# Patient Record
Sex: FEMALE | Race: WHITE | NOT HISPANIC OR LATINO | URBAN - METROPOLITAN AREA
[De-identification: names, ages, dates, MRNs, and addresses within clinical notes are randomized per-mention and may not be internally consistent; named-entity substitution may affect disease eponyms.]

---

## 2017-06-06 ENCOUNTER — HISTORICAL (OUTPATIENT)
Dept: ADMINISTRATIVE | Facility: HOSPITAL | Age: 27
End: 2017-06-06

## 2017-06-06 LAB
ABS NEUT (OLG): 3.04 X10(3)/MCL (ref 2.1–9.2)
ALBUMIN SERPL-MCNC: 4.9 GM/DL (ref 3.4–5)
ALBUMIN/GLOB SERPL: 1 RATIO (ref 1–2)
ALP SERPL-CCNC: 62 UNIT/L (ref 20–120)
ALT SERPL-CCNC: 14 UNIT/L
AST SERPL-CCNC: 16 UNIT/L
BASOPHILS # BLD AUTO: 0.03 X10(3)/MCL
BASOPHILS NFR BLD AUTO: 0 % (ref 0–1)
BILIRUB SERPL-MCNC: 1.1 MG/DL
BILIRUBIN DIRECT+TOT PNL SERPL-MCNC: 0.1 MG/DL
BILIRUBIN DIRECT+TOT PNL SERPL-MCNC: 1 MG/DL
BUN SERPL-MCNC: 12 MG/DL (ref 7–25)
CALCIUM SERPL-MCNC: 9.6 MG/DL (ref 8.4–10.3)
CHLORIDE SERPL-SCNC: 103 MMOL/L (ref 96–110)
CO2 SERPL-SCNC: 27 MMOL/L (ref 24–32)
CREAT SERPL-MCNC: 0.66 MG/DL (ref 0.7–1.1)
EOSINOPHIL # BLD AUTO: 0.13 10*3/UL
EOSINOPHIL NFR BLD AUTO: 2 % (ref 0–5)
ERYTHROCYTE [DISTWIDTH] IN BLOOD BY AUTOMATED COUNT: 12.3 % (ref 11.5–14.5)
GLOBULIN SER-MCNC: 3.6 GM/ML (ref 2.3–3.5)
GLUCOSE SERPL-MCNC: 97 MG/DL (ref 65–99)
HCT VFR BLD AUTO: 37.3 % (ref 35–46)
HGB BLD-MCNC: 12.7 GM/DL (ref 12–16)
HIV 1+2 AB+HIV1 P24 AG SERPL QL IA: NONREACTIVE
IMM GRANULOCYTES # BLD AUTO: 0.02 10*3/UL
IMM GRANULOCYTES NFR BLD AUTO: 0 %
LYMPHOCYTES # BLD AUTO: 2.49 X10(3)/MCL
LYMPHOCYTES NFR BLD AUTO: 40 % (ref 15–40)
MCH RBC QN AUTO: 28.2 PG (ref 26–34)
MCHC RBC AUTO-ENTMCNC: 34 GM/DL (ref 31–37)
MCV RBC AUTO: 82.7 FL (ref 80–100)
MONOCYTES # BLD AUTO: 0.44 X10(3)/MCL
MONOCYTES NFR BLD AUTO: 7 % (ref 4–12)
NEUTROPHILS # BLD AUTO: 3.04 X10(3)/MCL
NEUTROPHILS NFR BLD AUTO: 49 X10(3)/MCL
PLATELET # BLD AUTO: 311 X10(3)/MCL (ref 130–400)
PMV BLD AUTO: 10.3 FL (ref 7.4–10.4)
POTASSIUM SERPL-SCNC: 3.7 MMOL/L (ref 3.6–5.2)
PROT SERPL-MCNC: 8.5 GM/DL (ref 6–8)
RBC # BLD AUTO: 4.51 X10(6)/MCL (ref 4–5.2)
SODIUM SERPL-SCNC: 137 MMOL/L (ref 135–146)
TSH SERPL-ACNC: 0.89 MIU/L (ref 0.5–5)
WBC # SPEC AUTO: 6.2 X10(3)/MCL (ref 4.5–11)

## 2017-09-08 LAB — POC BETA-HCG (QUAL): NEGATIVE

## 2021-07-07 LAB — POC BETA-HCG (QUAL): NEGATIVE

## 2022-01-16 ENCOUNTER — HISTORICAL (OUTPATIENT)
Dept: ADMINISTRATIVE | Facility: HOSPITAL | Age: 32
End: 2022-01-16

## 2022-09-17 ENCOUNTER — HISTORICAL (OUTPATIENT)
Dept: ADMINISTRATIVE | Facility: HOSPITAL | Age: 32
End: 2022-09-17
Payer: MEDICAID

## 2023-01-30 DIAGNOSIS — N39.0 URINARY TRACT INFECTION WITHOUT HEMATURIA, SITE UNSPECIFIED: Primary | ICD-10-CM

## 2023-01-30 RX ORDER — PHENAZOPYRIDINE HYDROCHLORIDE 200 MG/1
200 TABLET, FILM COATED ORAL 3 TIMES DAILY PRN
Qty: 6 TABLET | Refills: 0 | Status: SHIPPED | OUTPATIENT
Start: 2023-01-30 | End: 2023-02-01

## 2023-01-30 RX ORDER — NITROFURANTOIN 25; 75 MG/1; MG/1
100 CAPSULE ORAL 2 TIMES DAILY
Qty: 14 CAPSULE | Refills: 0 | Status: SHIPPED | OUTPATIENT
Start: 2023-01-30 | End: 2023-02-06

## 2023-02-02 ENCOUNTER — TELEPHONE (OUTPATIENT)
Dept: FAMILY MEDICINE | Facility: CLINIC | Age: 33
End: 2023-02-02
Payer: MEDICAID

## 2023-02-02 NOTE — TELEPHONE ENCOUNTER
----- Message from Leelee Boles MD sent at 2/2/2023  1:09 PM CST -----  The Ucx is sensitive to Nitrofurantoin but there are other meds more sensitive than macrobid. If Sx did not get better then we can senf some Levofloxacin. Please let us know if you need Levofloxacin.

## 2023-04-04 DIAGNOSIS — J32.9 SINUSITIS, UNSPECIFIED CHRONICITY, UNSPECIFIED LOCATION: Primary | ICD-10-CM

## 2023-04-04 RX ORDER — AMOXICILLIN AND CLAVULANATE POTASSIUM 875; 125 MG/1; MG/1
1 TABLET, FILM COATED ORAL EVERY 12 HOURS
Qty: 14 TABLET | Refills: 0 | Status: SHIPPED | OUTPATIENT
Start: 2023-04-04 | End: 2023-04-04

## 2023-04-04 RX ORDER — AMOXICILLIN AND CLAVULANATE POTASSIUM 875; 125 MG/1; MG/1
1 TABLET, FILM COATED ORAL EVERY 12 HOURS
Qty: 14 TABLET | Refills: 0 | Status: SHIPPED | OUTPATIENT
Start: 2023-04-04 | End: 2023-04-11

## 2024-04-23 ENCOUNTER — LAB VISIT (OUTPATIENT)
Dept: LAB | Facility: HOSPITAL | Age: 34
End: 2024-04-23
Attending: OBSTETRICS & GYNECOLOGY
Payer: MEDICAID

## 2024-04-23 ENCOUNTER — TELEPHONE (OUTPATIENT)
Dept: FAMILY MEDICINE | Facility: CLINIC | Age: 34
End: 2024-04-23
Payer: MEDICAID

## 2024-04-23 DIAGNOSIS — Z01.818 OTHER SPECIFIED PRE-OPERATIVE EXAMINATION: ICD-10-CM

## 2024-04-23 DIAGNOSIS — Z01.818 OTHER SPECIFIED PRE-OPERATIVE EXAMINATION: Primary | ICD-10-CM

## 2024-04-23 LAB
BASOPHILS # BLD AUTO: 0.03 X10(3)/MCL
BASOPHILS NFR BLD AUTO: 0.6 %
EOSINOPHIL # BLD AUTO: 0.18 X10(3)/MCL (ref 0–0.9)
EOSINOPHIL NFR BLD AUTO: 3.5 %
ERYTHROCYTE [DISTWIDTH] IN BLOOD BY AUTOMATED COUNT: 13.8 % (ref 11.5–17)
HCT VFR BLD AUTO: 36.2 % (ref 37–47)
HGB BLD-MCNC: 12.1 G/DL (ref 12–16)
IMM GRANULOCYTES # BLD AUTO: 0.01 X10(3)/MCL (ref 0–0.04)
IMM GRANULOCYTES NFR BLD AUTO: 0.2 %
LYMPHOCYTES # BLD AUTO: 2.11 X10(3)/MCL (ref 0.6–4.6)
LYMPHOCYTES NFR BLD AUTO: 41.2 %
MCH RBC QN AUTO: 28.8 PG (ref 27–31)
MCHC RBC AUTO-ENTMCNC: 33.4 G/DL (ref 33–36)
MCV RBC AUTO: 86.2 FL (ref 80–94)
MONOCYTES # BLD AUTO: 0.32 X10(3)/MCL (ref 0.1–1.3)
MONOCYTES NFR BLD AUTO: 6.3 %
NEUTROPHILS # BLD AUTO: 2.47 X10(3)/MCL (ref 2.1–9.2)
NEUTROPHILS NFR BLD AUTO: 48.2 %
NRBC BLD AUTO-RTO: 0 %
PLATELET # BLD AUTO: 275 X10(3)/MCL (ref 130–400)
PMV BLD AUTO: 10 FL (ref 7.4–10.4)
RBC # BLD AUTO: 4.2 X10(6)/MCL (ref 4.2–5.4)
WBC # SPEC AUTO: 5.12 X10(3)/MCL (ref 4.5–11.5)

## 2024-04-23 PROCEDURE — 85025 COMPLETE CBC W/AUTO DIFF WBC: CPT

## 2024-04-23 PROCEDURE — 36415 COLL VENOUS BLD VENIPUNCTURE: CPT

## 2024-04-23 NOTE — TELEPHONE ENCOUNTER
----- Message from Leelee Boles MD sent at 4/23/2024  3:03 PM CDT -----  Please inform patient of lab results.    CBC normal   Recommend the patient to continue iron rich foods since the patient recently gave birth.  Encourage hydration and 35 minutes of brisk walking   Recommend to continue multivitamins   Start mental health exercises like yoga and reach out to family and PCP if feeling depressed      Thanks,    Dr. Boles

## 2024-04-23 NOTE — PROGRESS NOTES
Please inform patient of lab results.    CBC normal   Recommend the patient to continue iron rich foods since the patient recently gave birth.  Encourage hydration and 35 minutes of brisk walking   Recommend to continue multivitamins   Start mental health exercises like yoga and reach out to family and PCP if feeling depressed      Thanks,    Dr. Boles

## 2024-04-24 PROBLEM — Z30.2 ADMISSION FOR STERILIZATION: Status: ACTIVE | Noted: 2024-04-24

## 2024-04-24 NOTE — H&P
History & Physical  Gynecology    SUBJECTIVE:     Chief Complaint/Reason for Admission: Laparoscopic Bilateral Tubal Fulguration    History of Present Illness:  Patient is a 33 y.o.  who presents with undesired future fertility and desires permanent sterilization.     No current facility-administered medications for this encounter.     No current outpatient medications on file.       Review of patient's allergies indicates:   Allergen Reactions    Sulfa (sulfonamide antibiotics) Hives       Past medical history:none    Past surgical history:none         Review of Systems:  Constitutional: no fever or chills  Respiratory: no cough or shortness of breath  Cardiovascular: no chest pain or palpitations  Genitourinary: no hematuria or dysuria  Neuro:No headaches, dizziness or blurry vision.     OBJECTIVE:     Vital Signs (Most Recent):       Physical Exam:  General: well developed, well nourished  Lungs:  clear to auscultation bilaterally and normal respiratory effort  Cardiovascular: Heart with regular rate and rhythm.    Abdomen: soft and non tender  Back: no CVAT   Neuro: cranial nerves 2-12 grossly intact.  Pelvic:   NFEG w/o lesions noted. Small mobile uterus  Extremities: normal ROM, no homans or palpable cords.     Laboratory:  Lab Results   Component Value Date    WBC 5.12 2024    HGB 12.1 2024    HCT 36.2 (L) 2024    MCV 86.2 2024     2024       Ultrasound:  No results found for this or any previous visit.          ASSESSMENT/PLAN:     Active Hospital Problems    Diagnosis  POA    *Admission for sterilization [Z30.2]  Not Applicable      Resolved Hospital Problems   No resolved problems to display.       To OR for LBTF    Preop labs and studies ordered.    NPO 8 hours prior to surgery.     Medications reviewed.     Risks and benefits explained in detail to patient, including but not limited to damage to internal organs, including but not limited to bowel, bladder,  uterus, tubes, ovaries, nerves, arteries, veins, possible need for blood transfusion.  Patient understands that for all practical purposes that this procedure in not reversible. There is also a risk of failing sterility and this can be as high as 1:200. All questions answered. Patient is aware of all risks and desires surgery. Consents signed.

## 2024-04-27 NOTE — DISCHARGE INSTRUCTIONS
Tubal Ligation, Laparoscopic Surgery Discharge Instructions   About this topic   A tubal ligation is a long-lasting type of birth control for women. After this surgery, it would be rare for a woman to get pregnant. A woman's eggs are made in her ovaries. Once a month, an egg leaves the ovary and travels down a long thin tube to her uterus or womb. This tube is called the fallopian tube. If a sperm meets the egg, a woman can get pregnant.  During a tubal ligation, the fallopian tubes are cut or tied. Cutting or tying the tubes will stop the sperm from meeting the egg. After this surgery, most women cannot get pregnant.  A tubal ligation is a permanent form of birth control. You should be 100% sure before you have this surgery that you do not want to have any more children. You and your partner should have talked about your decision to not have any more children. A tubal ligation's reversal can occur but is not always successful.  This surgery does not protect you from sexually-transmitted diseases. You will still need to use a condom to protect yourself and your partner against these diseases.       What care is needed at home?   You have steri-strips. You may wash your incision with soap and water. Wash your hands before and after touching your incsions. The steri-strips may fall off on there on within 1-2 weeks. You do not have to peel them off.   You may take a shower tomorrow. No tub baths or soaking. No swimming.    Pelvic rest until your follow-up appointment with your doctor. No sex, tampons, and do not douche.  Ask your doctor when you can go back to your normal activities like work, driving, and sex  You can expect some bleeding from your vagina for a few weeks. You may use sanitary pads but not tampons.  Your bowel movements may take some time to get back to normal. Eat small meals high in fiber to avoid hard stools. Drink 6 to 8 glasses of water each day.  Try to walk each day. Start by walking a little  more than you did the day before. Walking boosts blood flow and helps prevent lung, belly, and blood problems.  You will continue to have periods as you did before.  Talk with your doctor about safe sex as you can still be exposed to sexually transmitted diseases.  Do not lift anything over 10 pounds (4.5 kg)  What follow-up care is needed?   Be sure to keep your visits.   Will physical activity be limited?   Talk with your doctor about the right amount of activity for you.  What problems could happen?   Infection  Wound opening  Heavy blood loss  Blood clots in your legs or lungs  Damage to your bowel, bladder, and other organs inside the belly  Tubes don't close all the way and you could become pregnant  Trouble passing bowel movements  When do I need to call the doctor?   Signs of infection such as a fever of 100.4°F (38°C) or higher, chills, pain with passing urine.  Signs of wound infection such as swelling, redness, warmth around the wound; too much pain when touched; yellowish, greenish, or bloody discharge; foul smell coming from the cut site; cut site opens up.  Excessive blood in your sanitary pads or more than six soaked pads in a day  Smelly green or dark yellow vaginal discharge  Upset stomach, throwing up, or very bad belly pain  Pain not helped by drugs you are taking  No bowel movement after 3 days  If you feel the need to pass urine but urine will not come out even after 6 hours  Swelling in your leg or arm that is much greater on one side than the other  Teach Back: Helping You Understand   The Teach Back Method helps you understand the information we are giving you. After you talk with the staff, tell them in your own words what you learned. This helps to make sure the staff has described each thing clearly. It also helps to explain things that may have been confusing. Before going home, make sure you can do these:  I can tell you about my procedure.  I can tell you how to care for my cut site.  I  can tell you what I will do if I have a fever, chills, bad smelling drainage from the vagina, or bad belly pain.  Where can I learn more?   American College [employer] Ob[employer]nec[employer]  https://www.acog.org/Patients/FAQs/Sterilization-by-Laparoscopy   Better [employer]  https://www.betterhealth.[place].gov.au/health/HealthyLiving/contraception-female-sterilisation   Office on Womens Health  https://www.[facility]ensh[facility] Hospit[facility].gov/a-z-topics/birth-control-methods   Last Reviewed Date   2019-10-18  Consumer Information Use and Disclaimer   This information is not specific medical advice and does not replace information you receive from your health care provider. This is only a brief summary of general information. It does NOT include all information about conditions, illnesses, injuries, tests, procedures, treatments, therapies, discharge instructions or life-style choices that may apply to you. You must talk with your health care provider for complete information about your health and treatment options. This information should not be used to decide whether or not to accept your health care providers advice, instructions or recommendations. Only your health care provider has the knowledge and training to provide advice that is right for you.  Copyright   Copyright © 2021 [employer] and its affiliates and/or licensors. All rights reserved.

## 2024-04-28 ENCOUNTER — PATIENT MESSAGE (OUTPATIENT)
Dept: ADMINISTRATIVE | Facility: OTHER | Age: 34
End: 2024-04-28
Payer: MEDICAID

## 2024-04-29 ENCOUNTER — ANESTHESIA EVENT (OUTPATIENT)
Dept: SURGERY | Facility: HOSPITAL | Age: 34
End: 2024-04-29
Payer: MEDICAID

## 2024-04-29 ENCOUNTER — HOSPITAL ENCOUNTER (OUTPATIENT)
Facility: HOSPITAL | Age: 34
Discharge: HOME | End: 2024-04-29
Attending: OBSTETRICS & GYNECOLOGY | Admitting: OBSTETRICS & GYNECOLOGY
Payer: MEDICAID

## 2024-04-29 ENCOUNTER — ANESTHESIA (OUTPATIENT)
Dept: SURGERY | Facility: HOSPITAL | Age: 34
End: 2024-04-29
Payer: MEDICAID

## 2024-04-29 DIAGNOSIS — Z30.2 ADMISSION FOR STERILIZATION: Primary | ICD-10-CM

## 2024-04-29 LAB
B-HCG UR QL: NEGATIVE
CTP QC/QA: YES

## 2024-04-29 PROCEDURE — 71000033 HC RECOVERY, INTIAL HOUR: Performed by: OBSTETRICS & GYNECOLOGY

## 2024-04-29 PROCEDURE — D9220A PRA ANESTHESIA: Mod: ANES,,, | Performed by: ANESTHESIOLOGY

## 2024-04-29 PROCEDURE — 81025 URINE PREGNANCY TEST: CPT | Performed by: OBSTETRICS & GYNECOLOGY

## 2024-04-29 PROCEDURE — 71000015 HC POSTOP RECOV 1ST HR: Performed by: OBSTETRICS & GYNECOLOGY

## 2024-04-29 PROCEDURE — 63600175 PHARM REV CODE 636 W HCPCS: Performed by: ANESTHESIOLOGY

## 2024-04-29 PROCEDURE — 63600175 PHARM REV CODE 636 W HCPCS

## 2024-04-29 PROCEDURE — 37000008 HC ANESTHESIA 1ST 15 MINUTES: Performed by: OBSTETRICS & GYNECOLOGY

## 2024-04-29 PROCEDURE — 25000003 PHARM REV CODE 250: Performed by: OBSTETRICS & GYNECOLOGY

## 2024-04-29 PROCEDURE — D9220A PRA ANESTHESIA: Mod: CRNA,,,

## 2024-04-29 PROCEDURE — 25000003 PHARM REV CODE 250: Performed by: ANESTHESIOLOGY

## 2024-04-29 PROCEDURE — 71000016 HC POSTOP RECOV ADDL HR: Performed by: OBSTETRICS & GYNECOLOGY

## 2024-04-29 PROCEDURE — 37000009 HC ANESTHESIA EA ADD 15 MINS: Performed by: OBSTETRICS & GYNECOLOGY

## 2024-04-29 PROCEDURE — 25000003 PHARM REV CODE 250

## 2024-04-29 PROCEDURE — 36000708 HC OR TIME LEV III 1ST 15 MIN: Performed by: OBSTETRICS & GYNECOLOGY

## 2024-04-29 PROCEDURE — 36000709 HC OR TIME LEV III EA ADD 15 MIN: Performed by: OBSTETRICS & GYNECOLOGY

## 2024-04-29 RX ORDER — KETOROLAC TROMETHAMINE 30 MG/ML
INJECTION, SOLUTION INTRAMUSCULAR; INTRAVENOUS
Status: DISCONTINUED | OUTPATIENT
Start: 2024-04-29 | End: 2024-04-29

## 2024-04-29 RX ORDER — LIDOCAINE HYDROCHLORIDE 10 MG/ML
1 INJECTION, SOLUTION EPIDURAL; INFILTRATION; INTRACAUDAL; PERINEURAL ONCE
Status: DISCONTINUED | OUTPATIENT
Start: 2024-04-29 | End: 2024-04-29 | Stop reason: HOSPADM

## 2024-04-29 RX ORDER — DEXMEDETOMIDINE HYDROCHLORIDE 100 UG/ML
INJECTION, SOLUTION INTRAVENOUS
Status: DISCONTINUED | OUTPATIENT
Start: 2024-04-29 | End: 2024-04-29

## 2024-04-29 RX ORDER — GLYCOPYRROLATE 0.2 MG/ML
INJECTION INTRAMUSCULAR; INTRAVENOUS
Status: DISCONTINUED | OUTPATIENT
Start: 2024-04-29 | End: 2024-04-29

## 2024-04-29 RX ORDER — HYDROCODONE BITARTRATE AND ACETAMINOPHEN 10; 325 MG/1; MG/1
1 TABLET ORAL EVERY 4 HOURS PRN
Status: DISCONTINUED | OUTPATIENT
Start: 2024-04-29 | End: 2024-04-29 | Stop reason: HOSPADM

## 2024-04-29 RX ORDER — HYDROCODONE BITARTRATE AND ACETAMINOPHEN 5; 325 MG/1; MG/1
1 TABLET ORAL
Status: CANCELLED | OUTPATIENT
Start: 2024-04-29

## 2024-04-29 RX ORDER — IBUPROFEN 800 MG/1
800 TABLET ORAL EVERY 8 HOURS PRN
Qty: 30 TABLET | Refills: 2 | Status: SHIPPED | OUTPATIENT
Start: 2024-04-29

## 2024-04-29 RX ORDER — ACETAMINOPHEN 325 MG/1
650 TABLET ORAL EVERY 4 HOURS PRN
Status: DISCONTINUED | OUTPATIENT
Start: 2024-04-29 | End: 2024-04-29 | Stop reason: HOSPADM

## 2024-04-29 RX ORDER — HYDROCODONE BITARTRATE AND ACETAMINOPHEN 5; 325 MG/1; MG/1
1 TABLET ORAL EVERY 6 HOURS PRN
Qty: 5 TABLET | Refills: 0 | Status: SHIPPED | OUTPATIENT
Start: 2024-04-29

## 2024-04-29 RX ORDER — ONDANSETRON 4 MG/1
8 TABLET, ORALLY DISINTEGRATING ORAL EVERY 8 HOURS PRN
Status: DISCONTINUED | OUTPATIENT
Start: 2024-04-29 | End: 2024-04-29 | Stop reason: HOSPADM

## 2024-04-29 RX ORDER — LIDOCAINE HYDROCHLORIDE 10 MG/ML
1 INJECTION, SOLUTION EPIDURAL; INFILTRATION; INTRACAUDAL; PERINEURAL ONCE AS NEEDED
Status: DISCONTINUED | OUTPATIENT
Start: 2024-04-29 | End: 2024-04-29 | Stop reason: HOSPADM

## 2024-04-29 RX ORDER — FENTANYL CITRATE 50 UG/ML
INJECTION, SOLUTION INTRAMUSCULAR; INTRAVENOUS
Status: DISCONTINUED | OUTPATIENT
Start: 2024-04-29 | End: 2024-04-29

## 2024-04-29 RX ORDER — ONDANSETRON HYDROCHLORIDE 2 MG/ML
INJECTION, SOLUTION INTRAMUSCULAR; INTRAVENOUS
Status: DISCONTINUED | OUTPATIENT
Start: 2024-04-29 | End: 2024-04-29

## 2024-04-29 RX ORDER — HYDROMORPHONE HYDROCHLORIDE 2 MG/ML
0.4 INJECTION, SOLUTION INTRAMUSCULAR; INTRAVENOUS; SUBCUTANEOUS EVERY 5 MIN PRN
Status: DISCONTINUED | OUTPATIENT
Start: 2024-04-29 | End: 2024-04-29 | Stop reason: HOSPADM

## 2024-04-29 RX ORDER — DEXAMETHASONE SODIUM PHOSPHATE 4 MG/ML
INJECTION, SOLUTION INTRA-ARTICULAR; INTRALESIONAL; INTRAMUSCULAR; INTRAVENOUS; SOFT TISSUE
Status: DISCONTINUED | OUTPATIENT
Start: 2024-04-29 | End: 2024-04-29

## 2024-04-29 RX ORDER — TRAMADOL HYDROCHLORIDE 50 MG/1
50 TABLET ORAL
Status: COMPLETED | OUTPATIENT
Start: 2024-04-29 | End: 2024-04-29

## 2024-04-29 RX ORDER — SODIUM CHLORIDE 9 MG/ML
INJECTION, SOLUTION INTRAVENOUS CONTINUOUS
Status: DISCONTINUED | OUTPATIENT
Start: 2024-04-29 | End: 2024-04-29 | Stop reason: HOSPADM

## 2024-04-29 RX ORDER — PROPOFOL 10 MG/ML
VIAL (ML) INTRAVENOUS
Status: DISCONTINUED | OUTPATIENT
Start: 2024-04-29 | End: 2024-04-29

## 2024-04-29 RX ORDER — ONDANSETRON 4 MG/1
4 TABLET, ORALLY DISINTEGRATING ORAL
Status: COMPLETED | OUTPATIENT
Start: 2024-04-29 | End: 2024-04-29

## 2024-04-29 RX ORDER — HYDROCODONE BITARTRATE AND ACETAMINOPHEN 5; 325 MG/1; MG/1
1 TABLET ORAL EVERY 4 HOURS PRN
Status: DISCONTINUED | OUTPATIENT
Start: 2024-04-29 | End: 2024-04-29 | Stop reason: HOSPADM

## 2024-04-29 RX ORDER — ACETAMINOPHEN 500 MG
1000 TABLET ORAL
Status: COMPLETED | OUTPATIENT
Start: 2024-04-29 | End: 2024-04-29

## 2024-04-29 RX ORDER — BUPIVACAINE HYDROCHLORIDE AND EPINEPHRINE 2.5; 5 MG/ML; UG/ML
INJECTION, SOLUTION EPIDURAL; INFILTRATION; INTRACAUDAL; PERINEURAL
Status: DISCONTINUED
Start: 2024-04-29 | End: 2024-04-29 | Stop reason: HOSPADM

## 2024-04-29 RX ORDER — SODIUM CHLORIDE, SODIUM LACTATE, POTASSIUM CHLORIDE, CALCIUM CHLORIDE 600; 310; 30; 20 MG/100ML; MG/100ML; MG/100ML; MG/100ML
INJECTION, SOLUTION INTRAVENOUS CONTINUOUS
Status: DISCONTINUED | OUTPATIENT
Start: 2024-04-29 | End: 2024-04-29 | Stop reason: HOSPADM

## 2024-04-29 RX ORDER — PROCHLORPERAZINE EDISYLATE 5 MG/ML
5 INJECTION INTRAMUSCULAR; INTRAVENOUS EVERY 30 MIN PRN
Status: DISCONTINUED | OUTPATIENT
Start: 2024-04-29 | End: 2024-04-29 | Stop reason: HOSPADM

## 2024-04-29 RX ORDER — BUPIVACAINE HYDROCHLORIDE AND EPINEPHRINE 2.5; 5 MG/ML; UG/ML
INJECTION, SOLUTION EPIDURAL; INFILTRATION; INTRACAUDAL; PERINEURAL
Status: DISCONTINUED | OUTPATIENT
Start: 2024-04-29 | End: 2024-04-29 | Stop reason: HOSPADM

## 2024-04-29 RX ORDER — MEPERIDINE HYDROCHLORIDE 25 MG/ML
6.25 INJECTION INTRAMUSCULAR; INTRAVENOUS; SUBCUTANEOUS ONCE AS NEEDED
Status: DISCONTINUED | OUTPATIENT
Start: 2024-04-29 | End: 2024-04-29 | Stop reason: HOSPADM

## 2024-04-29 RX ORDER — GABAPENTIN 300 MG/1
300 CAPSULE ORAL
Status: COMPLETED | OUTPATIENT
Start: 2024-04-29 | End: 2024-04-29

## 2024-04-29 RX ORDER — SODIUM CHLORIDE, SODIUM GLUCONATE, SODIUM ACETATE, POTASSIUM CHLORIDE AND MAGNESIUM CHLORIDE 30; 37; 368; 526; 502 MG/100ML; MG/100ML; MG/100ML; MG/100ML; MG/100ML
INJECTION, SOLUTION INTRAVENOUS CONTINUOUS
Status: DISCONTINUED | OUTPATIENT
Start: 2024-04-29 | End: 2024-04-29 | Stop reason: HOSPADM

## 2024-04-29 RX ORDER — ROCURONIUM BROMIDE 10 MG/ML
INJECTION, SOLUTION INTRAVENOUS
Status: DISCONTINUED | OUTPATIENT
Start: 2024-04-29 | End: 2024-04-29

## 2024-04-29 RX ORDER — LIDOCAINE HYDROCHLORIDE 10 MG/ML
INJECTION, SOLUTION EPIDURAL; INFILTRATION; INTRACAUDAL; PERINEURAL
Status: DISCONTINUED | OUTPATIENT
Start: 2024-04-29 | End: 2024-04-29

## 2024-04-29 RX ORDER — MIDAZOLAM HYDROCHLORIDE 2 MG/2ML
2 INJECTION, SOLUTION INTRAMUSCULAR; INTRAVENOUS ONCE AS NEEDED
Status: COMPLETED | OUTPATIENT
Start: 2024-04-29 | End: 2024-04-29

## 2024-04-29 RX ORDER — METHOCARBAMOL 100 MG/ML
1000 INJECTION, SOLUTION INTRAMUSCULAR; INTRAVENOUS ONCE AS NEEDED
Status: COMPLETED | OUTPATIENT
Start: 2024-04-29 | End: 2024-04-29

## 2024-04-29 RX ORDER — ONDANSETRON HYDROCHLORIDE 2 MG/ML
4 INJECTION, SOLUTION INTRAVENOUS DAILY PRN
Status: DISCONTINUED | OUTPATIENT
Start: 2024-04-29 | End: 2024-04-29 | Stop reason: HOSPADM

## 2024-04-29 RX ORDER — HYDROMORPHONE HYDROCHLORIDE 2 MG/ML
1 INJECTION, SOLUTION INTRAMUSCULAR; INTRAVENOUS; SUBCUTANEOUS EVERY 6 HOURS PRN
Status: DISCONTINUED | OUTPATIENT
Start: 2024-04-29 | End: 2024-04-29 | Stop reason: HOSPADM

## 2024-04-29 RX ADMIN — TRAMADOL HYDROCHLORIDE 50 MG: 50 TABLET, COATED ORAL at 07:04

## 2024-04-29 RX ADMIN — ROCURONIUM BROMIDE 40 MG: 50 INJECTION INTRAVENOUS at 07:04

## 2024-04-29 RX ADMIN — DEXMEDETOMIDINE 2 MCG: 200 INJECTION, SOLUTION INTRAVENOUS at 08:04

## 2024-04-29 RX ADMIN — SUGAMMADEX 200 MG: 100 INJECTION, SOLUTION INTRAVENOUS at 08:04

## 2024-04-29 RX ADMIN — FENTANYL CITRATE 50 MCG: 50 INJECTION, SOLUTION INTRAMUSCULAR; INTRAVENOUS at 08:04

## 2024-04-29 RX ADMIN — DEXMEDETOMIDINE 6 MCG: 200 INJECTION, SOLUTION INTRAVENOUS at 08:04

## 2024-04-29 RX ADMIN — ONDANSETRON 4 MG: 4 TABLET, ORALLY DISINTEGRATING ORAL at 07:04

## 2024-04-29 RX ADMIN — ONDANSETRON 4 MG: 2 INJECTION INTRAMUSCULAR; INTRAVENOUS at 08:04

## 2024-04-29 RX ADMIN — METHOCARBAMOL 1000 MG: 100 INJECTION INTRAMUSCULAR; INTRAVENOUS at 08:04

## 2024-04-29 RX ADMIN — MIDAZOLAM HYDROCHLORIDE 2 MG: 1 INJECTION, SOLUTION INTRAMUSCULAR; INTRAVENOUS at 07:04

## 2024-04-29 RX ADMIN — PROPOFOL 200 MG: 10 INJECTION, EMULSION INTRAVENOUS at 07:04

## 2024-04-29 RX ADMIN — FENTANYL CITRATE 50 MCG: 50 INJECTION, SOLUTION INTRAMUSCULAR; INTRAVENOUS at 07:04

## 2024-04-29 RX ADMIN — ACETAMINOPHEN 1000 MG: 500 TABLET ORAL at 07:04

## 2024-04-29 RX ADMIN — DEXAMETHASONE SODIUM PHOSPHATE 8 MG: 4 INJECTION, SOLUTION INTRA-ARTICULAR; INTRALESIONAL; INTRAMUSCULAR; INTRAVENOUS; SOFT TISSUE at 08:04

## 2024-04-29 RX ADMIN — DEXMEDETOMIDINE 8 MCG: 200 INJECTION, SOLUTION INTRAVENOUS at 08:04

## 2024-04-29 RX ADMIN — KETOROLAC TROMETHAMINE 30 MG: 30 INJECTION, SOLUTION INTRAMUSCULAR at 08:04

## 2024-04-29 RX ADMIN — GLYCOPYRROLATE 0.2 MG: 0.2 INJECTION INTRAMUSCULAR; INTRAVENOUS at 07:04

## 2024-04-29 RX ADMIN — SODIUM CHLORIDE, POTASSIUM CHLORIDE, SODIUM LACTATE AND CALCIUM CHLORIDE: 600; 310; 30; 20 INJECTION, SOLUTION INTRAVENOUS at 09:04

## 2024-04-29 RX ADMIN — SODIUM CHLORIDE, POTASSIUM CHLORIDE, SODIUM LACTATE AND CALCIUM CHLORIDE: 600; 310; 30; 20 INJECTION, SOLUTION INTRAVENOUS at 07:04

## 2024-04-29 RX ADMIN — GABAPENTIN 300 MG: 300 CAPSULE ORAL at 07:04

## 2024-04-29 RX ADMIN — LIDOCAINE HYDROCHLORIDE 50 MG: 10 INJECTION, SOLUTION EPIDURAL; INFILTRATION; INTRACAUDAL; PERINEURAL at 07:04

## 2024-04-29 NOTE — ANESTHESIA POSTPROCEDURE EVALUATION
Anesthesia Post Evaluation    Patient: Laquita MOHAMUD [place]    Procedure(s) Performed: Procedure(s) (LRB):  LIGATION, FALLOPIAN TUBE, LAPAROSCOPIC Bilateral (Bilateral)    Final Anesthesia Type: general      Patient location during evaluation: PACU  Patient participation: Yes- Able to Participate  Level of consciousness: awake and alert  Post-procedure vital signs: reviewed and stable  Pain management: adequate  Airway patency: patent    PONV status at discharge: No PONV  Anesthetic complications: no      Cardiovascular status: hemodynamically stable  Respiratory status: unassisted  Hydration status: euvolemic  Follow-up not needed.              Vitals Value Taken Time   BP 99/65 04/29/24 0931   Temp 36.4 °C (97.5 °F) 04/29/24 0840   Pulse 73 04/29/24 0932   Resp 18 04/29/24 0925   SpO2 95 % 04/29/24 0932   Vitals shown include unfiled device data.      Event Time   Out of Recovery 09:33:00         Pain/Maryann Score: Pain Rating Prior to Med Admin: 0 (4/29/2024  7:10 AM)  Maryann Score: 10 (4/29/2024  9:30 AM)

## 2024-04-29 NOTE — TRANSFER OF CARE
Anesthesia Transfer of Care Note    Patient: Laquita A [place]    Procedure(s) Performed: Procedure(s) (LRB):  LIGATION, FALLOPIAN TUBE, LAPAROSCOPIC Bilateral (Bilateral)    Patient location: PACU    Anesthesia Type: general    Transport from OR: Transported from OR on room air with adequate spontaneous ventilation    Post pain: adequate analgesia    Post assessment: no apparent anesthetic complications and tolerated procedure well    Post vital signs: stable    Level of consciousness: responds to stimulation    Nausea/Vomiting: no nausea/vomiting    Complications: none    Transfer of care protocol was followed      Last vitals:

## 2024-04-29 NOTE — ANESTHESIA PREPROCEDURE EVALUATION
04/29/2024  Laquita Rosen is a 33 y.o., female with   -------------------------------------    Patient-requested procedure       And No past surgical history on file.    Presents for lap BTL for desired sterility    Pt is breastfeeding -       Pre-op Assessment    I have reviewed the NPO Status.      Review of Systems      Physical Exam  General: Well nourished, Cooperative, Alert and Oriented    Airway:  Mallampati: II   Mouth Opening: Normal  TM Distance: Normal  Tongue: Normal  Neck ROM: Normal ROM    Dental:  Intact    Chest/Lungs:  Clear to auscultation, Normal Respiratory Rate    Heart:  Rate: Normal  Rhythm: Regular Rhythm        Anesthesia Plan  Type of Anesthesia, risks & benefits discussed:    Anesthesia Type: Gen ETT  Intra-op Monitoring Plan: Standard ASA Monitors  Post Op Pain Control Plan: multimodal analgesia and IV/PO Opioids PRN  Induction:  IV  Airway Plan: Direct, Post-Induction  Informed Consent: Informed consent signed with the Patient and all parties understand the risks and agree with anesthesia plan.  All questions answered.   ASA Score: 1  Day of Surgery Review of History & Physical: H&P Update referred to the surgeon/provider.  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    Ready For Surgery From Anesthesia Perspective.     .

## 2024-04-29 NOTE — OP NOTE
OPERATIVE NOTE       SUMMARY      Surgery Date: 2024      SURGEON: Jeffrey Euceda    ASSISTANT: Staff Scrub    PREOP DIAGNOSIS:  33 y.o.  with undesired future fertility and desires permanent sterilization.    POSTOP DIAGNOSIS:  Same as above    PROCEDURES:  Laparoscopic Bilateral Tubal Fulguration    ANESTHESIA:  GETA    FINDINGS/KEY COMPONENTS: Normal pelvic anatomy    PROCEDURE IN DETAIL:  After ensuring an informed consent the patient was taken to the operating room where general anesthesia was administered.  She was placed in a dorsal lithotomy position employing the adjustable Emeka stirrups.  She was prepped and draped in the usual sterile fashion.  Bladder was drained of urine with a flexible catheter.  Time-out was completed.  A sponge stick was placed in the vagina and attention was turned to the infraumbilical region where a 5 mm vertical skin incision was made within the umbilical fold and a 5 mm trocar and port were placed under direct visualization using the Funzio bladeless system.  No evidence of entry damage noted.  Pneumoperitoneum was obtained with CO2 gas to maximum pressure of 15 mmHg.  A 2nd port was placed in the midline just above the symphysis pubis it was placed under direct visualization with no evidence of entry damage noted.  The above findings were noted.  Both incision sites were pre prepped with 1.5 cc of 0.5% Marcaine with epinephrine.  Attention was turned to the left fallopian tube and the Kleppinger was used to fulgurate the tube to complete desiccation in 3 contiguous spots started in the mid ampullary region going towards the fimbriated end.  No viable tissue noted in between the burn sites.  Excellent hemostasis was appreciated.  The same procedure was carried out on the contralateral side without difficulty.  At this time all instruments removed and the CO2 gas was allowed to escape.  The trocars were removed and the incisions were reapproximated using  4-O Monocryl in a subcuticular fashion and then covered with a thin layer of Dermabond.  The sponge stick was removed from the vagina and the patient was cleansed of all blood and Betadine, taken out of dorsal lithotomy position awoken from anesthesia and taken to recovery room in stable condition.  Lap sponge instrument needle counts were correct x3.    ESTIMATED BLOOD LOSS: 1cc    Fluids:800cc    Urine Output: 150cc    COMPLICATIONS: none    PATHOLOGY:   Specimens (From admission, onward)      None

## 2024-04-29 NOTE — DISCHARGE SUMMARY
[facility]  Discharge Note/Brief Operative Note     SUMMARY     Surgery Date: 4/29/2024     Surgeons and Role:     * Jeffrey Euceda DO - Primary    Assisting Surgeon: None    Pre-op Diagnosis:  Sterilization [Z30.2]    Post-op Diagnosis:  Post-Op Diagnosis Codes:     * Sterilization [Z30.2]    Procedure(s) (LRB):  LIGATION, FALLOPIAN TUBE, LAPAROSCOPIC Bilateral (Bilateral)    Anesthesia: General    Findings/Key Components:  normal pelvic anatomy     Estimated Blood Loss: 1cc         Specimens:   Specimen (24h ago, onward)      None            Discharge Note    SUMMARY     Admit Date: 4/29/2024    Discharge Date and Time: No discharge date for patient encounter.    Attending Physician: Jeffrey Euceda, *     Discharge Provider: Jeffrey Euceda    Final Diagnosis: Post-Op Diagnosis Codes:     * Sterilization [Z30.2]    Disposition: Patient tolerated the procedure well. To Home or Self Care, discharged in good condition    Discharge meds: Ibuprofen 800gm and Norco 5/325, see printed scripts.    Follow Up/Patient Instructions:    Follow-up Information       Jeffrey Euceda DO Follow up in 2 week(s).    Specialty: Obstetrics and Gynecology  Why: Post-op, patient already has appointment scheduled  Contact information:  [address]  [address]  [place] [address]  [phone]                          Follow up in office at scheduled post op visit. Pelvic rest for 2 weeks.     Medications:  Reconciled Home Medications:   Current Discharge Medication List        START taking these medications    Details   HYDROcodone-acetaminophen (NORCO) 5-325 mg per tablet Take 1 tablet by mouth every 6 (six) hours as needed for Pain (not relieved by Ibuprofen).  Qty: 5 tablet, Refills: 0    Comments: Quantity prescribed more than 7 day supply? No      ibuprofen (ADVIL,MOTRIN) 800 MG tablet Take 1 tablet (800 mg total) by mouth every 8 (eight) hours as needed for Pain (Pain  and cramping).  Qty: 30 tablet, Refills: 2           Discharge Procedure Orders   Diet general     Pelvic Rest   Order Comments: For two weeks     Call MD for:  temperature >100.4     Call MD for:  persistent nausea and vomiting     Call MD for:  severe uncontrolled pain     Call MD for:  difficulty breathing, headache or visual disturbances     Call MD for:  redness, tenderness, or signs of infection (pain, swelling, redness, odor or green/yellow discharge around incision site)     Call MD for:  hives     Call MD for:  persistent dizziness or light-headedness     Activity as tolerated

## 2024-04-29 NOTE — INTERVAL H&P NOTE
I have seen the patient and reviewed this note, and there is no change in history and physical since the last exam. Labs have been reviewed. The urine pregnancy test is negative. The consents are signed and on the chart. Patient ready to proceed with permanent surgical sterilization.

## 2024-04-29 NOTE — PLAN OF CARE
Vitals signs stable. Pain and nausea well controlled. Discharge criteria/Maryann met.Ok for transfer to phase II per Dr. MARIA T Snell.

## 2024-04-29 NOTE — ANESTHESIA PROCEDURE NOTES
Intubation    Date/Time: 4/29/2024 8:00 AM    Performed by: Misty Haley CRNA  Authorized by: Anai Snell MD    Intubation:     Induction:  Intravenous    Intubated:  Postinduction    Mask Ventilation:  Easy mask    Attempts:  1    Attempted By:  CRNA    Method of Intubation:  Direct    Blade:  Hall 2    Laryngeal View Grade: Grade I - full view of cords      Difficult Airway Encountered?: No      Complications:  None    Airway Device:  Oral endotracheal tube    Airway Device Size:  6.5    Style/Cuff Inflation:  Cuffed (inflated to minimal occlusive pressure)    Inflation Amount (mL):  6    Tube secured:  21    Secured at:  The lips    Placement Verified By:  Capnometry    Complicating Factors:  None    Findings Post-Intubation:  BS equal bilateral

## 2024-04-30 ENCOUNTER — PATIENT MESSAGE (OUTPATIENT)
Dept: ADMINISTRATIVE | Facility: OTHER | Age: 34
End: 2024-04-30
Payer: MEDICAID

## 2024-04-30 VITALS
TEMPERATURE: 99 F | BODY MASS INDEX: 28.11 KG/M2 | SYSTOLIC BLOOD PRESSURE: 108 MMHG | DIASTOLIC BLOOD PRESSURE: 74 MMHG | OXYGEN SATURATION: 98 % | HEART RATE: 80 BPM | RESPIRATION RATE: 18 BRPM | WEIGHT: 152.75 LBS | HEIGHT: 62 IN

## 2024-04-30 DIAGNOSIS — R19.8 UMBILICUS DISCHARGE: Primary | ICD-10-CM

## 2024-04-30 DIAGNOSIS — R19.8 UMBILICUS DISCHARGE: ICD-10-CM

## 2024-04-30 RX ORDER — CLINDAMYCIN HYDROCHLORIDE 300 MG/1
300 CAPSULE ORAL 2 TIMES DAILY
Qty: 20 CAPSULE | Refills: 0 | Status: SHIPPED | OUTPATIENT
Start: 2024-04-30 | End: 2024-05-10

## 2024-04-30 NOTE — PROGRESS NOTES
Staphylococcus aureus, Streptococcus pyogenes, and Gram-negative bacteria such as Escherichia coli, Klebsiella pneumoniae, and Proteus mirabilis

## 2024-05-01 ENCOUNTER — PATIENT MESSAGE (OUTPATIENT)
Dept: ADMINISTRATIVE | Facility: OTHER | Age: 34
End: 2024-05-01
Payer: MEDICAID

## 2024-05-02 NOTE — PROGRESS NOTES
Please inform patient of lab results.    Wound culture demonstrates no growth within 48 hours.    Thanks,   Dr. Boles

## 2024-05-03 LAB — BACTERIA WND CULT: NO GROWTH

## 2024-05-06 ENCOUNTER — PATIENT MESSAGE (OUTPATIENT)
Dept: FAMILY MEDICINE | Facility: CLINIC | Age: 34
End: 2024-05-06
Payer: MEDICAID

## 2024-05-21 LAB
PAP RECOMMENDATION EXT: NORMAL
PAP SMEAR: NORMAL

## 2024-08-01 ENCOUNTER — PATIENT MESSAGE (OUTPATIENT)
Dept: FAMILY MEDICINE | Facility: CLINIC | Age: 34
End: 2024-08-01
Payer: MEDICAID

## 2024-12-24 ENCOUNTER — OFFICE VISIT (OUTPATIENT)
Dept: FAMILY MEDICINE | Facility: CLINIC | Age: 34
End: 2024-12-24
Payer: MEDICAID

## 2024-12-24 VITALS
WEIGHT: 155 LBS | OXYGEN SATURATION: 98 % | HEIGHT: 62 IN | SYSTOLIC BLOOD PRESSURE: 116 MMHG | DIASTOLIC BLOOD PRESSURE: 75 MMHG | RESPIRATION RATE: 16 BRPM | HEART RATE: 87 BPM | BODY MASS INDEX: 28.52 KG/M2

## 2024-12-24 DIAGNOSIS — T63.304A SPIDER BITE WOUND, UNDETERMINED INTENT, INITIAL ENCOUNTER: ICD-10-CM

## 2024-12-24 DIAGNOSIS — L03.317 CELLULITIS OF BUTTOCK: Primary | ICD-10-CM

## 2024-12-24 DIAGNOSIS — L28.2 PAPULAR URTICARIA: ICD-10-CM

## 2024-12-24 PROBLEM — T63.301A SPIDER BITE: Status: ACTIVE | Noted: 2024-12-24

## 2024-12-24 PROCEDURE — 3074F SYST BP LT 130 MM HG: CPT | Mod: CPTII,,,

## 2024-12-24 PROCEDURE — G2211 COMPLEX E/M VISIT ADD ON: HCPCS | Mod: ,,,

## 2024-12-24 PROCEDURE — 1160F RVW MEDS BY RX/DR IN RCRD: CPT | Mod: CPTII,,,

## 2024-12-24 PROCEDURE — 99214 OFFICE O/P EST MOD 30 MIN: CPT | Mod: ,,,

## 2024-12-24 PROCEDURE — 1159F MED LIST DOCD IN RCRD: CPT | Mod: CPTII,,,

## 2024-12-24 PROCEDURE — 3078F DIAST BP <80 MM HG: CPT | Mod: CPTII,,,

## 2024-12-24 PROCEDURE — 3008F BODY MASS INDEX DOCD: CPT | Mod: CPTII,,,

## 2024-12-24 RX ORDER — FLUOXETINE HYDROCHLORIDE 20 MG/1
20 CAPSULE ORAL DAILY
COMMUNITY
Start: 2024-12-13

## 2024-12-24 RX ORDER — DEXAMETHASONE SODIUM PHOSPHATE 10 MG/ML
10 INJECTION INTRAMUSCULAR; INTRAVENOUS ONCE
Status: COMPLETED | OUTPATIENT
Start: 2024-12-24 | End: 2024-12-24

## 2024-12-24 RX ORDER — DOXYCYCLINE 100 MG/1
100 CAPSULE ORAL EVERY 12 HOURS
Qty: 10 CAPSULE | Refills: 0 | Status: SHIPPED | OUTPATIENT
Start: 2024-12-24 | End: 2024-12-29

## 2024-12-24 RX ORDER — LORATADINE 10 MG/1
10 TABLET ORAL DAILY
Qty: 30 TABLET | Refills: 0 | Status: SHIPPED | OUTPATIENT
Start: 2024-12-24 | End: 2025-01-23

## 2024-12-24 RX ORDER — KETOROLAC TROMETHAMINE 30 MG/ML
15 INJECTION, SOLUTION INTRAMUSCULAR; INTRAVENOUS ONCE
Status: COMPLETED | OUTPATIENT
Start: 2024-12-24 | End: 2024-12-24

## 2024-12-24 RX ADMIN — KETOROLAC TROMETHAMINE 15 MG: 30 INJECTION, SOLUTION INTRAMUSCULAR; INTRAVENOUS at 09:12

## 2024-12-24 RX ADMIN — DEXAMETHASONE SODIUM PHOSPHATE 10 MG: 10 INJECTION INTRAMUSCULAR; INTRAVENOUS at 09:12

## 2024-12-24 NOTE — PROGRESS NOTES
Patient ID: [id number]     Chief Complaint: Rash    HPI:     Laquita Rosen is a 34 y.o. female here today for c/o rash. Associated symptoms of pain, itching and redness. States she noticed the rash one day ago. She denies fever, chills, nausea, vomiting, injury. She denies insect bites, new soaps, new detergents or new perfumes.        Past Medical History:   Diagnosis Date    Patient-requested procedure         Past Surgical History:   Procedure Laterality Date    LAPAROSCOPIC LIGATION OF FALLOPIAN TUBE Bilateral 4/29/2024    Procedure: LIGATION, FALLOPIAN TUBE, LAPAROSCOPIC Bilateral;  Surgeon: Jeffrey Euceda DO;  Location: [facility];  Service: OB/GYN;  Laterality: Bilateral;        Social History     Tobacco Use    Smoking status: Never    Smokeless tobacco: Never   Substance and Sexual Activity    Alcohol use: Never    Drug use: Never    Sexual activity: Not on file        Current Outpatient Medications   Medication Instructions    doxycycline (MONODOX) 100 mg, Oral, Every 12 hours    FLUoxetine 20 mg, Daily    HYDROcodone-acetaminophen (NORCO) 5-325 mg per tablet 1 tablet, Oral, Every 6 hours PRN    ibuprofen (ADVIL,MOTRIN) 800 mg, Oral, Every 8 hours PRN    loratadine (CLARITIN) 10 mg, Oral, Daily       Review of patient's allergies indicates:   Allergen Reactions    Sulfa (sulfonamide antibiotics) Hives    Alahist cf [dexbrompheniramine-phenylep-dm] Rash        Patient Care Team:  Leelee Boles MD as PCP - General (Family Medicine)     Subjective:     Review of Systems    Constitutional: Denies Change in appetite. Denies Chills. Denies Fever. Denies Night sweats.  Eye: Denies Blurred vision. Denies Discharge. Denies Eye pain.  ENT: Denies Decreased hearing. Denies Sore throat. Denies Swollen glands.  Respiratory: Denies Cough. Denies Shortness of breath. Denies Shortness of breath with exertion. Denies Wheezing.  Cardiovascular: Denies Chest pain at rest. Denies Chest pain with exertion. Denies  "Irregular heartbeat. Denies Palpitations.  Gastrointestinal: Denies Abdominal pain. Denies Diarrhea. Denies Nausea. Denies Vomiting. Denies Hematemesis or Hematochezia.  Genitourinary: Denies Dysuria. Denies Urinary frequency. Denies Urinary urgency. Denies Blood in urine.  Endocrine: Denies Cold intolerance. Denies Excessive thirst. Denies Heat intolerance. Denies Weight loss. Denies Weight gain.  Musculoskeletal: Denies Painful joints. Denies Weakness.  Integumentary:  Reports Rash and Itching. Denies Dry skin.  Neurologic: Denies Dizziness. Denies Fainting. Denies Headache.  Psychiatric: Denies Depression. Denies Anxiety. Denies Suicidal/Homicidal ideations.    12 point review of systems conducted, negative except as stated in the history of present illness. See HPI for details.    Objective:     Visit Vitals  /75 (BP Location: Left arm, Patient Position: Sitting)   Pulse 87   Resp 16   Ht 5' 2" (1.575 m)   Wt 70.3 kg (155 lb)   LMP 12/01/2024 (Exact Date)   SpO2 98%   Breastfeeding No   BMI 28.35 kg/m²       Physical Exam  General: Alert and oriented, No acute distress.   Eye: Pupils are equal, round and reactive to light, Extraocular movements are intact, Sclera non-icteric.  Ears/Nose/Throat: Normal, Mucosa moist, Clear.  Respiratory: Clear to auscultation bilaterally; No wheezes, rales or rhonchi, Non-labored respirations, Symmetrical chest wall expansion.  Cardiovascular: Regular rate and rhythm, S1/S2 normal, No murmurs, rubs or gallops.  Gastrointestinal: Soft, Non-tender, Non-distended, Normal bowel sounds, No palpable organomegaly.  Musculoskeletal: Normal range of motion.  Integumentary:  Left upper buttock to insect puncture site x2. Pruritic, papular, swelling, erythema, clear drainage area approximately 4 cm in length.   Neurologic: No focal deficits, Cranial Nerves II-XII are grossly intact, Motor strength normal upper and lower extremities, Sensory exam intact.  Psychiatric: Normal " interaction, Coherent speech, Euthymic mood, Appropriate affect     Assessment:       ICD-10-CM ICD-9-CM   1. Cellulitis of buttock  L03.317 682.5   2. Papular urticaria  L28.2 698.2   3. Spider bite wound, undetermined intent, initial encounter  T63.304A 989.5     E980.9      Plan:     1. Cellulitis of buttock  Assessment & Plan:  Rx of doxycycline as prescribed. Complete antibiotic as prescribed. Notify provider of medication side effects.  Go to the emergency department if symptoms of chest pain/tightness, shortness of breath, fever/chills, temp >100.4 or any acute illness.   Return to the Clinic if symptoms do not improve.      Orders:  -     doxycycline (MONODOX) 100 MG capsule; Take 1 capsule (100 mg total) by mouth every 12 (twelve) hours. for 5 days  Dispense: 10 capsule; Refill: 0  -     ketorolac injection 15 mg  -     dexAMETHasone injection 10 mg    2. Papular urticaria  Assessment & Plan:  Same as #1.    Orders:  -     loratadine (CLARITIN) 10 mg tablet; Take 1 tablet (10 mg total) by mouth once daily.  Dispense: 30 tablet; Refill: 0  -     dexAMETHasone injection 10 mg    3. Spider bite wound, undetermined intent, initial encounter  Assessment & Plan:  Same as #1.    Orders:  -     doxycycline (MONODOX) 100 MG capsule; Take 1 capsule (100 mg total) by mouth every 12 (twelve) hours. for 5 days  Dispense: 10 capsule; Refill: 0         Keep future appointments as scheduled.   In addition to their scheduled follow up, the patient has also been instructed to follow up on as needed basis.         GELA Olivas

## 2024-12-26 PROBLEM — L03.317 CELLULITIS OF BUTTOCK: Status: ACTIVE | Noted: 2024-12-26

## 2024-12-26 PROBLEM — L28.2 PAPULAR URTICARIA: Status: ACTIVE | Noted: 2024-12-26

## 2024-12-26 NOTE — ASSESSMENT & PLAN NOTE
Rx of doxycycline as prescribed. Complete antibiotic as prescribed. Notify provider of medication side effects.  Go to the emergency department if symptoms of chest pain/tightness, shortness of breath, fever/chills, temp >100.4 or any acute illness.   Return to the Clinic if symptoms do not improve.

## 2025-03-12 ENCOUNTER — OFFICE VISIT (OUTPATIENT)
Dept: FAMILY MEDICINE | Facility: CLINIC | Age: 35
End: 2025-03-12
Payer: MEDICAID

## 2025-03-12 VITALS
RESPIRATION RATE: 18 BRPM | BODY MASS INDEX: 27.73 KG/M2 | WEIGHT: 150.69 LBS | SYSTOLIC BLOOD PRESSURE: 102 MMHG | HEIGHT: 62 IN | OXYGEN SATURATION: 98 % | HEART RATE: 83 BPM | DIASTOLIC BLOOD PRESSURE: 71 MMHG | TEMPERATURE: 98 F

## 2025-03-12 DIAGNOSIS — Z11.4 ENCOUNTER FOR SCREENING FOR HIV: ICD-10-CM

## 2025-03-12 DIAGNOSIS — Z00.00 WELLNESS EXAMINATION: ICD-10-CM

## 2025-03-12 DIAGNOSIS — Z82.49 FAMILY HISTORY OF BRAIN ANEURYSM: ICD-10-CM

## 2025-03-12 DIAGNOSIS — Z91.51 HISTORY OF SUICIDE ATTEMPT: ICD-10-CM

## 2025-03-12 DIAGNOSIS — Z11.59 ENCOUNTER FOR HEPATITIS C SCREENING TEST FOR LOW RISK PATIENT: ICD-10-CM

## 2025-03-12 DIAGNOSIS — F43.10 PTSD (POST-TRAUMATIC STRESS DISORDER): ICD-10-CM

## 2025-03-12 DIAGNOSIS — F90.9 ATTENTION DEFICIT HYPERACTIVITY DISORDER (ADHD), UNSPECIFIED ADHD TYPE: ICD-10-CM

## 2025-03-12 DIAGNOSIS — Z76.89 ENCOUNTER TO ESTABLISH CARE: ICD-10-CM

## 2025-03-12 DIAGNOSIS — F60.3 BORDERLINE PERSONALITY DISORDER: ICD-10-CM
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
## 2025-03-14 ENCOUNTER — TELEPHONE (OUTPATIENT)
Dept: NEUROSURGERY | Facility: CLINIC | Age: 35
End: 2025-03-14
Payer: MEDICAID

## 2025-03-14 NOTE — TELEPHONE ENCOUNTER
Patient was referred to Dr. Suazo by Dr. Boles fro family history of brain aneurysm.     Patient has no updated brain imaging.     Per referring 3/12/25 progress note:  Patient has a strong family history of brain aneurysm where her several family members  because of brain aneurysm.  Her mom also has a history of brain aneurysm at age 45.

## 2025-03-17 DIAGNOSIS — Z82.49 FAMILY HISTORY OF BRAIN ANEURYSM: Primary | ICD-10-CM

## 2025-03-17 DIAGNOSIS — L28.2 PAPULAR URTICARIA: ICD-10-CM

## 2025-03-17 RX ORDER — LORATADINE 10 MG/1
10 TABLET ORAL DAILY
Qty: 30 TABLET | Refills: 0 | Status: SHIPPED | OUTPATIENT
Start: 2025-03-17 | End: 2025-04-16

## 2025-03-20 ENCOUNTER — DOCUMENTATION ONLY (OUTPATIENT)
Dept: FAMILY MEDICINE | Facility: CLINIC | Age: 35
End: 2025-03-20
Payer: MEDICAID

## 2025-03-20 ENCOUNTER — RESULTS FOLLOW-UP (OUTPATIENT)
Dept: FAMILY MEDICINE | Facility: CLINIC | Age: 35
End: 2025-03-20

## 2025-03-20 NOTE — PROGRESS NOTES
Please inform the patient that PAP SMEAR result -     Demonstrated no intraepithelial lesion at this time    HPV negative    Repeat Pap in 5 years

## 2025-03-21 ENCOUNTER — RESULTS FOLLOW-UP (OUTPATIENT)
Dept: FAMILY MEDICINE | Facility: CLINIC | Age: 35
End: 2025-03-21

## 2025-03-21 NOTE — PROGRESS NOTES
Impression:     Normal brain MRA with no intracranial aneurysm identified.  Please fax the results to Dr. Sheldon Suazo's office

## 2025-04-22 ENCOUNTER — DOCUMENTATION ONLY (OUTPATIENT)
Dept: FAMILY MEDICINE | Facility: CLINIC | Age: 35
End: 2025-04-22
Payer: MEDICAID

## 2025-04-22 ENCOUNTER — PATIENT MESSAGE (OUTPATIENT)
Dept: FAMILY MEDICINE | Facility: CLINIC | Age: 35
End: 2025-04-22
Payer: MEDICAID

## 2025-04-23 ENCOUNTER — PATIENT MESSAGE (OUTPATIENT)
Dept: FAMILY MEDICINE | Facility: CLINIC | Age: 35
End: 2025-04-23

## 2025-04-23 ENCOUNTER — OFFICE VISIT (OUTPATIENT)
Dept: FAMILY MEDICINE | Facility: CLINIC | Age: 35
End: 2025-04-23
Payer: MEDICAID

## 2025-04-23 VITALS
TEMPERATURE: 98 F | BODY MASS INDEX: 30.07 KG/M2 | HEART RATE: 81 BPM | SYSTOLIC BLOOD PRESSURE: 106 MMHG | DIASTOLIC BLOOD PRESSURE: 71 MMHG | HEIGHT: 62 IN | RESPIRATION RATE: 18 BRPM | WEIGHT: 163.38 LBS | OXYGEN SATURATION: 98 %

## 2025-04-23 DIAGNOSIS — S52.022A CLOSED FRACTURE OF OLECRANON PROCESS OF LEFT ULNA, INITIAL ENCOUNTER: ICD-10-CM

## 2025-04-23 PROBLEM — F31.9 BIPOLAR DISORDER: Status: ACTIVE | Noted: 2025-04-23

## 2025-04-23 PROBLEM — Z78.9 NOT IMMUNE TO RUBELLA: Status: ACTIVE | Noted: 2025-04-23

## 2025-04-23 PROCEDURE — 3078F DIAST BP <80 MM HG: CPT | Mod: CPTII,,, | Performed by: STUDENT IN AN ORGANIZED HEALTH CARE EDUCATION/TRAINING PROGRAM

## 2025-04-23 PROCEDURE — 3074F SYST BP LT 130 MM HG: CPT | Mod: CPTII,,, | Performed by: STUDENT IN AN ORGANIZED HEALTH CARE EDUCATION/TRAINING PROGRAM

## 2025-04-23 PROCEDURE — 1159F MED LIST DOCD IN RCRD: CPT | Mod: CPTII,,, | Performed by: STUDENT IN AN ORGANIZED HEALTH CARE EDUCATION/TRAINING PROGRAM

## 2025-04-23 PROCEDURE — 3008F BODY MASS INDEX DOCD: CPT | Mod: CPTII,,, | Performed by: STUDENT IN AN ORGANIZED HEALTH CARE EDUCATION/TRAINING PROGRAM

## 2025-04-23 PROCEDURE — 99214 OFFICE O/P EST MOD 30 MIN: CPT | Mod: ,,, | Performed by: STUDENT IN AN ORGANIZED HEALTH CARE EDUCATION/TRAINING PROGRAM

## 2025-04-23 RX ORDER — METHOCARBAMOL 500 MG/1
500 TABLET, FILM COATED ORAL 2 TIMES DAILY PRN
Qty: 30 TABLET | Refills: 0 | Status: SHIPPED | OUTPATIENT
Start: 2025-04-23 | End: 2025-05-23

## 2025-04-23 RX ORDER — KETOROLAC TROMETHAMINE 10 MG/1
10 TABLET, FILM COATED ORAL EVERY 6 HOURS
Qty: 20 TABLET | Refills: 0 | Status: SHIPPED | OUTPATIENT
Start: 2025-04-23 | End: 2025-04-28

## 2025-04-23 RX ORDER — TRAMADOL HYDROCHLORIDE 50 MG/1
50 TABLET ORAL EVERY 12 HOURS PRN
Qty: 14 TABLET | Refills: 0 | Status: SHIPPED | OUTPATIENT
Start: 2025-04-23 | End: 2025-04-30
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
## 2025-04-29 ENCOUNTER — RESULTS FOLLOW-UP (OUTPATIENT)
Dept: FAMILY MEDICINE | Facility: CLINIC | Age: 35
End: 2025-04-29

## 2025-04-29 DIAGNOSIS — R31.9 HEMATURIA, UNSPECIFIED TYPE: Primary | ICD-10-CM

## 2025-04-29 PROCEDURE — 87086 URINE CULTURE/COLONY COUNT: CPT | Performed by: STUDENT IN AN ORGANIZED HEALTH CARE EDUCATION/TRAINING PROGRAM
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
## 2025-04-29 NOTE — PROGRESS NOTES
Patient has RBC in urine  Please find out if she is on Menses  Please call lab to add urine culture   If patient is symptomatic for UTI then please let me know so that I can send some antibiotics to the phaalyson    Thanks,    Dr. Boles

## 2025-04-30 ENCOUNTER — RESULTS FOLLOW-UP (OUTPATIENT)
Dept: FAMILY MEDICINE | Facility: CLINIC | Age: 35
End: 2025-04-30

## 2025-04-30 ENCOUNTER — PATIENT MESSAGE (OUTPATIENT)
Dept: FAMILY MEDICINE | Facility: CLINIC | Age: 35
End: 2025-04-30
Payer: MEDICAID

## 2025-04-30 ENCOUNTER — OFFICE VISIT (OUTPATIENT)
Dept: ORTHOPEDICS | Facility: CLINIC | Age: 35
End: 2025-04-30
Payer: MEDICAID

## 2025-04-30 ENCOUNTER — HOSPITAL ENCOUNTER (OUTPATIENT)
Dept: RADIOLOGY | Facility: HOSPITAL | Age: 35
Discharge: HOME | End: 2025-04-30
Attending: STUDENT IN AN ORGANIZED HEALTH CARE EDUCATION/TRAINING PROGRAM
Payer: MEDICAID

## 2025-04-30 VITALS
DIASTOLIC BLOOD PRESSURE: 77 MMHG | HEART RATE: 73 BPM | TEMPERATURE: 98 F | BODY MASS INDEX: 29.82 KG/M2 | WEIGHT: 162.06 LBS | SYSTOLIC BLOOD PRESSURE: 114 MMHG | HEIGHT: 62 IN

## 2025-04-30 DIAGNOSIS — S52.022A CLOSED FRACTURE OF OLECRANON PROCESS OF LEFT ULNA, INITIAL ENCOUNTER: ICD-10-CM

## 2025-04-30 DIAGNOSIS — T14.8XXA BONE BRUISE: Primary | ICD-10-CM

## 2025-04-30 DIAGNOSIS — S52.022A CLOSED FRACTURE OF OLECRANON PROCESS OF LEFT ULNA, INITIAL ENCOUNTER: Primary | ICD-10-CM

## 2025-04-30 PROCEDURE — 99213 OFFICE O/P EST LOW 20 MIN: CPT | Mod: PBBFAC,25 | Performed by: STUDENT IN AN ORGANIZED HEALTH CARE EDUCATION/TRAINING PROGRAM

## 2025-04-30 PROCEDURE — 73080 X-RAY EXAM OF ELBOW: CPT | Mod: TC,LT

## 2025-04-30 RX ORDER — MELOXICAM 15 MG/1
15 TABLET ORAL DAILY
Qty: 30 TABLET | Refills: 0 | Status: SHIPPED | OUTPATIENT
Start: 2025-04-30 | End: 2025-05-30

## 2025-05-01 LAB — BACTERIA UR CULT: NO GROWTH

## 2025-05-02 ENCOUNTER — PATIENT MESSAGE (OUTPATIENT)
Dept: FAMILY MEDICINE | Facility: CLINIC | Age: 35
End: 2025-05-02
Payer: MEDICAID

## 2025-05-02 NOTE — PROGRESS NOTES
Faculty Attestation: Laquita Rosen  was seen in [facility] Medicine Clinic Patient seen and evaluated at the time of the visit. History of Present Illness, Physical Exam, and Assessment and Plan reviewed.     Treatment plan is reasonable and appropriate. Compliance with treatment recommendations is important.      Radiology images independently reviewed and agree with fellow interpretation.     No procedure was performed.    Andrzej Quick MD  Sports Medicine

## 2025-05-05 ENCOUNTER — RESULTS FOLLOW-UP (OUTPATIENT)
Dept: FAMILY MEDICINE | Facility: CLINIC | Age: 35
End: 2025-05-05

## 2025-05-05 ENCOUNTER — E-VISIT (OUTPATIENT)
Dept: FAMILY MEDICINE | Facility: CLINIC | Age: 35
End: 2025-05-05
Payer: MEDICAID

## 2025-05-05 ENCOUNTER — TELEPHONE (OUTPATIENT)
Dept: FAMILY MEDICINE | Facility: CLINIC | Age: 35
End: 2025-05-05
Payer: MEDICAID

## 2025-05-05 DIAGNOSIS — J06.9 UPPER RESPIRATORY TRACT INFECTION, UNSPECIFIED TYPE: Primary | ICD-10-CM

## 2025-05-05 DIAGNOSIS — R52 BODY ACHES: ICD-10-CM

## 2025-05-05 DIAGNOSIS — J06.9 UPPER RESPIRATORY TRACT INFECTION, UNSPECIFIED TYPE: ICD-10-CM

## 2025-05-05 DIAGNOSIS — R05.9 COUGH, UNSPECIFIED TYPE: ICD-10-CM

## 2025-05-05 RX ORDER — AZELASTINE 1 MG/ML
1 SPRAY, METERED NASAL 2 TIMES DAILY
Qty: 30 ML | Refills: 0 | Status: SHIPPED | OUTPATIENT
Start: 2025-05-05 | End: 2026-05-05

## 2025-05-05 RX ORDER — LEVOCETIRIZINE DIHYDROCHLORIDE 5 MG/1
5 TABLET, FILM COATED ORAL NIGHTLY
Qty: 30 TABLET | Refills: 0 | Status: SHIPPED | OUTPATIENT
Start: 2025-05-05 | End: 2025-06-04

## 2025-05-05 RX ORDER — MELOXICAM 7.5 MG/1
7.5 TABLET ORAL DAILY
Qty: 15 TABLET | Refills: 0 | Status: SHIPPED | OUTPATIENT
Start: 2025-05-05 | End: 2025-05-20

## 2025-05-05 RX ORDER — PREDNISONE 20 MG/1
20 TABLET ORAL 2 TIMES DAILY
Qty: 10 TABLET | Refills: 0 | Status: SHIPPED | OUTPATIENT
Start: 2025-05-05 | End: 2025-05-10

## 2025-05-05 NOTE — PROGRESS NOTES
Please inform patient of the following results.    COVID positive   Fever / Body Aches: Use OTC Tylenol or Motrin per package instructions for fever or body aches.  Sore Throat: Use OTC lozenges or throat sprays, gargle with warm salt and water, warm tea with honey.   Cough: Use Dextromethorphan/Doxylamine Cough Syrup at night.   Cover your mouth with coughing, avoid sharing cups or lip balm, wash your hand before eating or touching your face.    Seek emergency care for breathing difficulties, chest pain, shortness of breath, or confusion. Given the nature of this disease, severe respiratory distress can develop suddenly on day 8 or 9 of clinical course.        Thanks,    Dr. Boles

## 2025-05-05 NOTE — PROGRESS NOTES
Dear Laquita,     Thank you for reaching out to me for an E-Visit so we can address your concern regarding: General Illness (Entered automatically based on patient selection in Mett[facility].)     I have reviewed your chart and read the answers to the questionnaire that you completed.  Based on the information provided, my diagnosis and recommendations are as follows:    Visit Diagnosis  URI  Body aches  Cough  Nasal stuffiness  Results of flu/COVID/RSV  are pending    For symptomatic relief I have sent you the following  Anti-inflammatory-meloxicam take 1 tablet as needed.  Increase hydration while taking meloxicam.  Anti histamine- levo cetirizine 1 tablet as prescribed  Prednisone to calm down inflammation  Nasal stuffiness- Azelastine as prescribed  Cough- can take OTC mucinex    Please do not take above medications and notify MD if you are currently pregnant    Hope you feel better soon  If you are having any shortness a breath, chest pain, dizziness, falls, lower extremity edema then please visit ER and notify MD    Dr. Boles   1. Upper respiratory tract infection, unspecified type    2. Body aches    3. Cough, unspecified type      Medications Ordered                [facility] #[id number] - [place] - [address]   [address], [place] [address]    Telephone: [phone]   Fax: [phone]   Hours: Not open 24 hours                         E-Prescribed (3 of 3)              levocetirizine (XYZAL) 5 MG tablet    Sig: Take 1 tablet (5 mg total) by mouth every evening.       Start: 5/5/25     Quantity: 30 tablet Refills: 0                         meloxicam (MOBIC) 7.5 MG tablet    Sig: Take 1 tablet (7.5 mg total) by mouth once daily. for 15 days       Start: 5/5/25     Quantity: 15 tablet Refills: 0                         predniSONE (DELTASONE) 20 MG tablet    Sig: Take 1 tablet (20 mg total) by mouth 2 (two) times daily. for 5 days       Start: 5/5/25      Quantity: 10 tablet Refills: 0                            Visit Orders  No orders of the defined types were placed in this encounter.       Please let me know if there is something else that you need.  If you send additional messages concerning this illness, please use this message thread to communicate.      Leelee Boles MD

## 2025-05-06 ENCOUNTER — PATIENT MESSAGE (OUTPATIENT)
Dept: FAMILY MEDICINE | Facility: CLINIC | Age: 35
End: 2025-05-06
Payer: MEDICAID

## 2025-05-20 RX ORDER — FLUOXETINE 20 MG/1
20 CAPSULE ORAL DAILY
Qty: 90 CAPSULE | Refills: 0 | Status: SHIPPED | OUTPATIENT
Start: 2025-05-20

## 2025-05-27 DIAGNOSIS — Z23 NEED FOR VARICELLA VACCINE: Primary | ICD-10-CM

## 2025-05-27 NOTE — PROGRESS NOTES
Patient would like to get varicella vaccine to get started for LPN.  Reports that she is on 1st day of menses.  Status post tubal ligation 2024.

## 2025-06-09 ENCOUNTER — OFFICE VISIT (OUTPATIENT)
Dept: FAMILY MEDICINE | Facility: CLINIC | Age: 35
End: 2025-06-09
Payer: MEDICAID

## 2025-06-09 VITALS
RESPIRATION RATE: 18 BRPM | SYSTOLIC BLOOD PRESSURE: 101 MMHG | WEIGHT: 166 LBS | OXYGEN SATURATION: 98 % | HEIGHT: 62 IN | DIASTOLIC BLOOD PRESSURE: 70 MMHG | HEART RATE: 81 BPM | BODY MASS INDEX: 30.55 KG/M2 | TEMPERATURE: 98 F

## 2025-06-09 DIAGNOSIS — F31.9 BIPOLAR AFFECTIVE DISORDER, REMISSION STATUS UNSPECIFIED: ICD-10-CM

## 2025-06-09 DIAGNOSIS — Z82.49 FAMILY HISTORY OF BRAIN ANEURYSM: ICD-10-CM

## 2025-06-09 DIAGNOSIS — E66.811 CLASS 1 OBESITY DUE TO EXCESS CALORIES WITH SERIOUS COMORBIDITY AND BODY MASS INDEX (BMI) OF 30.0 TO 30.9 IN ADULT: ICD-10-CM

## 2025-06-09 DIAGNOSIS — F43.10 PTSD (POST-TRAUMATIC STRESS DISORDER): ICD-10-CM

## 2025-06-09 DIAGNOSIS — S52.022A CLOSED FRACTURE OF OLECRANON PROCESS OF LEFT ULNA, INITIAL ENCOUNTER: ICD-10-CM

## 2025-06-09 DIAGNOSIS — Z00.00 WELL ADULT HEALTH CHECK: ICD-10-CM

## 2025-06-09 DIAGNOSIS — E78.2 MIXED HYPERLIPIDEMIA: ICD-10-CM

## 2025-06-09 DIAGNOSIS — F90.2 ATTENTION DEFICIT HYPERACTIVITY DISORDER (ADHD), COMBINED TYPE: ICD-10-CM

## 2025-06-09 DIAGNOSIS — Z00.00 WELLNESS EXAMINATION: ICD-10-CM

## 2025-06-09 DIAGNOSIS — E66.09 CLASS 1 OBESITY DUE TO EXCESS CALORIES WITH SERIOUS COMORBIDITY AND BODY MASS INDEX (BMI) OF 30.0 TO 30.9 IN ADULT: ICD-10-CM

## 2025-06-09 LAB
ACCEPTIBLE SP GR UR QL: 1.02 (ref 1–1.03)
AMPHET UR QL SCN: NEGATIVE
BARBITURATE SCN PRESENT UR: NEGATIVE
BENZODIAZ UR QL SCN: NEGATIVE
CANNABINOIDS UR QL SCN: NEGATIVE
COCAINE UR QL SCN: NEGATIVE
FENTANYL UR QL SCN: NEGATIVE
MDMA UR QL SCN: NEGATIVE
OPIATES UR QL SCN: NEGATIVE
PCP UR QL: NEGATIVE
PH UR: 5.5 [PH] (ref 3–11)

## 2025-06-09 RX ORDER — PREDNISONE 20 MG/1
20 TABLET ORAL 2 TIMES DAILY
COMMUNITY
Start: 2025-05-20 | End: 2025-06-09

## 2025-06-09 RX ORDER — MELOXICAM 7.5 MG/1
7.5 TABLET ORAL DAILY
COMMUNITY
Start: 2025-05-20 | End: 2025-06-09

## 2025-06-09 NOTE — PROGRESS NOTES
Family Medicine    Patient ID: [id number]     Chief Complaint: Annual Exam      HPI:     Laquita Rosen is a 35 y.o. female here today for an annual wellness visit.    History of Present Illness             Borderline personality   PTSD   Well-controlled on Prozac   Seeing a therapist     Family history of brain aneurysm  MRA all within normal    ADHD  ADHD self-reporting scale positive for ADHD symptoms   Reports that son got diagnosed of ADHD and he is doing extremely well on medications           Past Medical History:   Diagnosis Date    Allergy     Anxiety     Asthma     Bipolar disorder     Bone bruise 2025    Borderline personality disorder     Obsessive-compulsive disorder     Patient-requested procedure         Past Surgical History:   Procedure Laterality Date    LAPAROSCOPIC LIGATION OF FALLOPIAN TUBE Bilateral 04/29/2024    Procedure: LIGATION, FALLOPIAN TUBE, LAPAROSCOPIC Bilateral;  Surgeon: Jeffrey Euceda DO;  Location: [facility];  Service: OB/GYN;  Laterality: Bilateral;    TUBAL LIGATION  04/29/24        Social History     Tobacco Use    Smoking status: Never    Smokeless tobacco: Never   Substance and Sexual Activity    Alcohol use: Never    Drug use: Never    Sexual activity: Yes     Partners: Male     Birth control/protection: None        Current Outpatient Medications   Medication Instructions    azelastine (ASTELIN) 137 mcg, Nasal, 2 times daily    FLUoxetine 20 mg, Oral, Daily    ibuprofen (ADVIL,MOTRIN) 800 mg, Oral, Every 8 hours PRN    levocetirizine (XYZAL) 5 mg, Oral, Nightly    meloxicam (MOBIC) 7.5 mg, Daily    predniSONE (DELTASONE) 20 mg, 2 times daily       Review of patient's allergies indicates:   Allergen Reactions    Elinor fresh ii Swelling    Penicillins Hives    Grass pollen Itching    Alahist cf [dexbrompheniramine-phenylep-dm] Rash    Chlorphen-pseudoephed-methscop Rash    Eucalyptus containing products Itching and Rash    Sulfa (sulfonamide antibiotics) Hives and Rash  "       Patient Care Team:  Leelee Boles MD as PCP - General (Family Medicine)     Subjective:     Review of Systems    12 point review of systems conducted, negative except as stated in the history of present illness. See HPI for details.    Objective:     Visit Vitals  /70 (BP Location: Left arm, Patient Position: Sitting)   Pulse 81   Temp 98.4 °F (36.9 °C) (Oral)   Resp 18   Ht 5' 2" (1.575 m)   Wt 75.3 kg (166 lb)   LMP 05/26/2025 (Approximate)   SpO2 98%   BMI 30.36 kg/m²       Physical Exam    General: Alert and oriented, Obese,  No acute distress.  Respiratory: Clear to auscultation bilaterally; No wheezes, rales or rhonchi,Non-labored respirations, Symmetrical chest wall expansion.  Cardiovascular: Regular rate and rhythm, S1/S2 normal, No murmurs, rubs or gallops.  Psychiatric: Normal interaction, Coherent speech, Euthymic mood, Appropriate affect   Labs Reviewed:     Chemistry:  Lab Results   Component Value Date     04/29/2025    K 4.1 04/29/2025    BUN 16.2 04/29/2025    CREATININE 0.75 04/29/2025    EGFRNORACEVR >60 04/29/2025    CALCIUM 8.8 04/29/2025    ALKPHOS 80 04/29/2025    ALBUMIN 4.1 04/29/2025    BILIDIR 0.1 06/06/2017    IBILI 1.0 06/06/2017    AST 29 04/29/2025    ALT 32 04/29/2025    TSH 1.134 04/29/2025    ZAVYTK4TYIV 0.83 04/29/2025        Lab Results   Component Value Date    HGBA1C 5.5 04/29/2025        Hematology:  Lab Results   Component Value Date    WBC 7.07 04/30/2025    HGB 12.4 04/30/2025    HCT 37.6 04/30/2025     04/30/2025       Lipid Panel:  Lab Results   Component Value Date    CHOL 191 04/29/2025    HDL 49 04/29/2025    .00 04/29/2025    TRIG 121 04/29/2025    TOTALCHOLEST 4 04/29/2025        Urine:  Lab Results   Component Value Date    APPEARANCEUA Clear 04/29/2025    SGUA 1.019 04/29/2025    PROTEINUA Negative 04/29/2025    KETONESUA Negative 04/29/2025    NITRITESUA Negative 06/04/2023    LEUKOCYTESUR Negative 04/29/2025    RBCUA 21-50 " (A) 04/29/2025    WBCUA 0-5 04/29/2025    BACTERIA None Seen 04/29/2025    SQEPUA Trace 04/29/2025        Assessment:       ICD-10-CM ICD-9-CM   1. Wellness examination  Z00.00 V70.0   2. Bipolar affective disorder, remission status unspecified  F31.9 296.80   3. PTSD (post-traumatic stress disorder)  F43.10 309.81   4. Family history of brain aneurysm  Z82.49 V17.1   5. Closed fracture of olecranon process of left ulna, initial encounter  S52.022A 813.01   6. Mixed hyperlipidemia  E78.2 272.2   7. Attention deficit hyperactivity disorder (ADHD), combined type  F90.2 314.01   8. Well adult health check  Z00.00 V70.0        Plan:       Vaccinations -   Immunization History   Administered Date(s) Administered    COVID-19, MRNA, LN-S, PF (Pfizer) (Purple Cap) 11/23/2021, 12/14/2021    DTP 1992, 1992, 02/03/1993, 08/05/1993    HPV 9-Valent 05/17/2007, 10/31/2007, 02/18/2008    HPV Quadrivalent 05/17/2007, 10/31/2007, 02/18/2008    Hepatitis B, Pediatric/Adolescent 08/05/2004, 10/02/2006, 05/17/2007    Hib-HbOC 1992, 1992, 02/03/1993, 08/05/1993    IPV 08/05/2004    Influenza - Quadrivalent - PF (6-35 months) 09/24/2018    Influenza - Quadrivalent - PF *Preferred* (6 months and older) 02/22/2016, 10/01/2021, 11/18/2022, 10/03/2023    Influenza - Trivalent (PED) 10/31/2007    Influenza - Trivalent - Afluria, Fluzone MDV 09/11/2011    Influenza - Trivalent - Fluarix, Flulaval, Fluzone, Afluria - PF 09/11/2011, 02/22/2016, 10/30/2024    Influenza - Trivalent - PF (PED) 10/31/2007    MMR 08/05/1993, 08/05/2004    Meningococcal Conjugate (MCV4P) 11/05/2010    OPV 1992, 1992, 08/05/1993    Td (ADULT) 08/05/2004    Tdap 11/05/2010, 04/27/2016, 11/28/2020          Assessment & Plan               1. Wellness examination  Female  Cervical Cancer Screening - Pap smear by Dr. Jeffrey Euceda on 05/21/2024 with HPV negative.  Will repeat in 2029  Breast Cancer Screening - After 40  Lung  Cancer-(50-80) Smoker/ Ex smoker- Never smoked  Colon Cancer Screening - After 45  Osteoporosis Screening - After 65  Eye Exam - Recommend Annually  Dental Exam - Recommend biannually    Diet discussed (healthy food choices, reduce portions and overall calorie intake)  Exercise 30-45 minutes 5x per week  Avoid excessive alcohol and tobacco if taking  Immunizations dicussed  Monthly breast exam recommended  Preventative exams discussed.     2. Bipolar affective disorder, remission status unspecified  3. PTSD (post-traumatic stress disorder)  Continue Prozac   Stable   Continue to monitor    4. Family history of brain aneurysm  Imaging unremarkable   Continue to monitor    5. Closed fracture of olecranon process of left ulna, initial encounter  Stable improving   Continue to monitor    6. Mixed hyperlipidemia  Lifestyle modification recommended   Increase physical activity and start 35 minutes of brisk walk  Stick to Mediterranean diet    7. Attention deficit hyperactivity disorder (ADHD), combined type  -     Drug Screen, Urine  -     EKG 12-lead; Future  UPT negative   We will start on Rx after seeing the EKG    8. Class 1 obesity  Body mass index is 30.36 kg/m².  Goal BMI <30.  Exercise 5 times a week for 30 minutes per day.  Avoid soda, simple sugars, excessive rice, potatoes or bread. Limit fast foods and fried foods.  Choose complex carbs in moderation (example: green vegetables, beans, oatmeal). Eat plenty of fresh fruits and vegetables with lean meats daily.  Do not skip meals. Eat a balanced portion size.  Avoid fad diets. Consider permanent healthy life style changes.     9. Well adult health check  -     CBC Auto Differential; Future; Expected date: 06/09/2026  -     Comprehensive Metabolic Panel; Future; Expected date: 06/09/2026  -     Lipid Panel; Future; Expected date: 06/09/2026  -     TSH; Future; Expected date: 06/09/2026  -     Hemoglobin A1C; Future; Expected date: 06/09/2026  -     Urinalysis;  Future; Expected date: 06/09/2026  -     Microalbumin/Creatinine Ratio, Urine; Future; Expected date: 06/09/2026  -     T4, Free; Future; Expected date: 06/09/2026  -     Uric Acid; Future; Expected date: 06/09/2026  -     Vitamin B12; Future; Expected date: 06/09/2026  -     Folate; Future; Expected date: 06/09/2026          Follow up in about 3 months (around 9/9/2025) for Virtual Visit 3 months ADHD. 1 AW. In addition to their scheduled follow up, the patient has also been instructed to follow up on as needed basis.     This note was generated with the assistance of ambient listening technology. Verbal consent was obtained by the patient and accompanying visitor(s) for the recording of patient appointment to facilitate this note. I attest to having reviewed and edited the generated note for accuracy, though some syntax or spelling errors may persist. Please contact the author of this note for any clarification.    Future Appointments   Date Time Provider Department Center   6/18/2025  9:30 AM Tank De Jesus DO [facility] Boles MD

## 2025-06-10 ENCOUNTER — RESULTS FOLLOW-UP (OUTPATIENT)
Dept: FAMILY MEDICINE | Facility: CLINIC | Age: 35
End: 2025-06-10

## 2025-06-12 DIAGNOSIS — J06.9 UPPER RESPIRATORY TRACT INFECTION, UNSPECIFIED TYPE: ICD-10-CM

## 2025-06-12 RX ORDER — LEVOCETIRIZINE DIHYDROCHLORIDE 5 MG/1
5 TABLET, FILM COATED ORAL NIGHTLY
Qty: 30 TABLET | Refills: 0 | Status: SHIPPED | OUTPATIENT
Start: 2025-06-12 | End: 2025-07-12

## 2025-06-16 DIAGNOSIS — J06.9 UPPER RESPIRATORY TRACT INFECTION, UNSPECIFIED TYPE: ICD-10-CM

## 2025-06-16 RX ORDER — LEVOCETIRIZINE DIHYDROCHLORIDE 5 MG/1
5 TABLET, FILM COATED ORAL NIGHTLY
Qty: 90 TABLET | Refills: 0 | Status: SHIPPED | OUTPATIENT
Start: 2025-06-16 | End: 2025-09-14

## 2025-06-23 DIAGNOSIS — F41.1 GAD (GENERALIZED ANXIETY DISORDER): ICD-10-CM

## 2025-06-23 RX ORDER — BUPROPION HYDROCHLORIDE 150 MG/1
150 TABLET, EXTENDED RELEASE ORAL 2 TIMES DAILY
Qty: 180 TABLET | Refills: 0 | Status: SHIPPED | OUTPATIENT
Start: 2025-06-23 | End: 2025-09-21

## 2025-07-22 DIAGNOSIS — R82.998 URINE WBC INCREASED: ICD-10-CM

## 2025-07-22 DIAGNOSIS — N39.0 URINARY TRACT INFECTION WITHOUT HEMATURIA, SITE UNSPECIFIED: Primary | ICD-10-CM

## 2025-07-22 PROCEDURE — 87086 URINE CULTURE/COLONY COUNT: CPT | Performed by: STUDENT IN AN ORGANIZED HEALTH CARE EDUCATION/TRAINING PROGRAM

## 2025-07-25 LAB — BACTERIA UR CULT: NORMAL

## 2025-08-13 ENCOUNTER — OFFICE VISIT (OUTPATIENT)
Dept: FAMILY MEDICINE | Facility: CLINIC | Age: 35
End: 2025-08-13
Payer: MEDICAID

## 2025-08-13 VITALS
WEIGHT: 172 LBS | HEART RATE: 80 BPM | DIASTOLIC BLOOD PRESSURE: 76 MMHG | SYSTOLIC BLOOD PRESSURE: 112 MMHG | RESPIRATION RATE: 16 BRPM | BODY MASS INDEX: 31.65 KG/M2 | HEIGHT: 62 IN | TEMPERATURE: 99 F | OXYGEN SATURATION: 98 %

## 2025-08-13 DIAGNOSIS — E66.09 CLASS 1 OBESITY DUE TO EXCESS CALORIES WITH SERIOUS COMORBIDITY AND BODY MASS INDEX (BMI) OF 31.0 TO 31.9 IN ADULT: ICD-10-CM

## 2025-08-13 DIAGNOSIS — R68.82 DECREASED SEX DRIVE: ICD-10-CM

## 2025-08-13 DIAGNOSIS — F31.32 BIPOLAR AFFECTIVE DISORDER, CURRENTLY DEPRESSED, MODERATE: ICD-10-CM

## 2025-08-13 DIAGNOSIS — E66.811 CLASS 1 OBESITY DUE TO EXCESS CALORIES WITH SERIOUS COMORBIDITY AND BODY MASS INDEX (BMI) OF 31.0 TO 31.9 IN ADULT: ICD-10-CM

## 2025-08-13 DIAGNOSIS — F90.2 ATTENTION DEFICIT HYPERACTIVITY DISORDER (ADHD), COMBINED TYPE: ICD-10-CM

## 2025-08-13 LAB
B-HCG UR QL: NEGATIVE
CTP QC/QA: YES

## 2025-08-13 PROCEDURE — 3074F SYST BP LT 130 MM HG: CPT | Mod: CPTII,,, | Performed by: STUDENT IN AN ORGANIZED HEALTH CARE EDUCATION/TRAINING PROGRAM

## 2025-08-13 PROCEDURE — 99214 OFFICE O/P EST MOD 30 MIN: CPT | Mod: ,,, | Performed by: STUDENT IN AN ORGANIZED HEALTH CARE EDUCATION/TRAINING PROGRAM

## 2025-08-13 PROCEDURE — 3044F HG A1C LEVEL LT 7.0%: CPT | Mod: CPTII,,, | Performed by: STUDENT IN AN ORGANIZED HEALTH CARE EDUCATION/TRAINING PROGRAM

## 2025-08-13 PROCEDURE — 1160F RVW MEDS BY RX/DR IN RCRD: CPT | Mod: CPTII,,, | Performed by: STUDENT IN AN ORGANIZED HEALTH CARE EDUCATION/TRAINING PROGRAM

## 2025-08-13 PROCEDURE — 3008F BODY MASS INDEX DOCD: CPT | Mod: CPTII,,, | Performed by: STUDENT IN AN ORGANIZED HEALTH CARE EDUCATION/TRAINING PROGRAM

## 2025-08-13 PROCEDURE — 3078F DIAST BP <80 MM HG: CPT | Mod: CPTII,,, | Performed by: STUDENT IN AN ORGANIZED HEALTH CARE EDUCATION/TRAINING PROGRAM

## 2025-08-13 PROCEDURE — 1159F MED LIST DOCD IN RCRD: CPT | Mod: CPTII,,, | Performed by: STUDENT IN AN ORGANIZED HEALTH CARE EDUCATION/TRAINING PROGRAM

## 2025-08-13 RX ORDER — ARIPIPRAZOLE 5 MG/1
5 TABLET ORAL DAILY
Qty: 30 TABLET | Refills: 11 | Status: SHIPPED | OUTPATIENT
Start: 2025-08-13 | End: 2026-08-13

## 2025-08-13 RX ORDER — SEMAGLUTIDE 0.25 MG/.5ML
0.25 INJECTION, SOLUTION SUBCUTANEOUS
Qty: 2 ML | Refills: 0 | Status: SHIPPED | OUTPATIENT
Start: 2025-08-13 | End: 2025-09-12

## 2025-08-20 ENCOUNTER — CLINICAL SUPPORT (OUTPATIENT)
Dept: FAMILY MEDICINE | Facility: CLINIC | Age: 35
End: 2025-08-20
Payer: MEDICAID

## 2025-08-20 DIAGNOSIS — R68.82 DECREASED SEX DRIVE: Primary | ICD-10-CM

## 2025-08-20 DIAGNOSIS — F90.2 ATTENTION DEFICIT HYPERACTIVITY DISORDER (ADHD), COMBINED TYPE: ICD-10-CM

## 2025-08-25 ENCOUNTER — E-VISIT (OUTPATIENT)
Dept: FAMILY MEDICINE | Facility: CLINIC | Age: 35
End: 2025-08-25
Payer: MEDICAID

## 2025-08-25 DIAGNOSIS — K21.9 GASTROESOPHAGEAL REFLUX DISEASE WITHOUT ESOPHAGITIS: ICD-10-CM

## 2025-08-25 DIAGNOSIS — F90.2 ATTENTION DEFICIT HYPERACTIVITY DISORDER (ADHD), COMBINED TYPE: ICD-10-CM

## 2025-08-25 DIAGNOSIS — R11.0 NAUSEA: ICD-10-CM

## 2025-08-25 RX ORDER — PANTOPRAZOLE SODIUM 40 MG/1
40 TABLET, DELAYED RELEASE ORAL DAILY
Qty: 30 TABLET | Refills: 0 | Status: SHIPPED | OUTPATIENT
Start: 2025-08-25 | End: 2025-09-24

## 2025-08-25 RX ORDER — ONDANSETRON 8 MG/1
8 TABLET, FILM COATED ORAL 2 TIMES DAILY PRN
Qty: 20 TABLET | Refills: 0 | Status: SHIPPED | OUTPATIENT
Start: 2025-08-25 | End: 2025-09-04

## 2025-08-25 RX ORDER — LISDEXAMFETAMINE DIMESYLATE 10 MG/1
10 CAPSULE ORAL EVERY MORNING
Qty: 30 CAPSULE | Refills: 0 | Status: SHIPPED | OUTPATIENT
Start: 2025-08-25 | End: 2025-09-24

## 2025-08-27 ENCOUNTER — DOCUMENTATION ONLY (OUTPATIENT)
Dept: FAMILY MEDICINE | Facility: CLINIC | Age: 35
End: 2025-08-27

## 2025-08-27 DIAGNOSIS — F90.2 ATTENTION DEFICIT HYPERACTIVITY DISORDER (ADHD), COMBINED TYPE: Primary | ICD-10-CM

## 2025-08-27 PROCEDURE — 81025 URINE PREGNANCY TEST: CPT | Mod: ,,, | Performed by: STUDENT IN AN ORGANIZED HEALTH CARE EDUCATION/TRAINING PROGRAM

## 2025-09-02 ENCOUNTER — TELEPHONE (OUTPATIENT)
Dept: FAMILY MEDICINE | Facility: CLINIC | Age: 35
End: 2025-09-02

## 2025-09-02 ENCOUNTER — CLINICAL SUPPORT (OUTPATIENT)
Dept: FAMILY MEDICINE | Facility: CLINIC | Age: 35
End: 2025-09-02
Payer: MEDICAID

## 2025-09-02 DIAGNOSIS — Z23 FLU VACCINE NEED: Primary | ICD-10-CM

## 2025-09-02 DIAGNOSIS — Z23 NEED FOR INFLUENZA VACCINATION: Primary | ICD-10-CM

## 2025-09-02 PROCEDURE — 90661 CCIIV3 VAC ABX FR 0.5 ML IM: CPT | Mod: ,,, | Performed by: STUDENT IN AN ORGANIZED HEALTH CARE EDUCATION/TRAINING PROGRAM

## 2025-09-02 PROCEDURE — 90471 IMMUNIZATION ADMIN: CPT | Mod: ,,, | Performed by: STUDENT IN AN ORGANIZED HEALTH CARE EDUCATION/TRAINING PROGRAM

## (undated) DEVICE — NDL ECLIPSE SAFETY 23G 1.5IN

## (undated) DEVICE — TUBING INSUFFLATOR W/ROT CONCT

## (undated) DEVICE — NDL BLUNT W/O FILTER 18GX1.5IN

## (undated) DEVICE — APPLICATOR STRL COT 2INNR 6IN

## (undated) DEVICE — SOL ELECTROLUBE ANTI-STIC

## (undated) DEVICE — JELLY SURGILUBE LUBE TUBE 2OZ

## (undated) DEVICE — TRAY SKIN SCRUB WET PREMIUM

## (undated) DEVICE — Device

## (undated) DEVICE — CANNULA ENDOPATH XCEL 5X100MM

## (undated) DEVICE — KIT GYN LAPAROSCOPY LAFAYETTE

## (undated) DEVICE — SUT MCRYL PLUS 4-0 PS2 27IN

## (undated) DEVICE — TROCAR ENDOPATH XCEL 5X100MM

## (undated) DEVICE — DRAPE UTILITY W/ TAPE 20X30IN

## (undated) DEVICE — ADHESIVE DERMABOND ADVANCED

## (undated) DEVICE — GLOVE SIGNATURE ESSNTL LTX 7.5